# Patient Record
Sex: MALE | Race: WHITE | NOT HISPANIC OR LATINO | URBAN - METROPOLITAN AREA
[De-identification: names, ages, dates, MRNs, and addresses within clinical notes are randomized per-mention and may not be internally consistent; named-entity substitution may affect disease eponyms.]

---

## 2018-02-24 ENCOUNTER — EMERGENCY (EMERGENCY)
Facility: HOSPITAL | Age: 58
LOS: 1 days | Discharge: HOME | End: 2018-02-24
Admitting: EMERGENCY MEDICINE
Payer: COMMERCIAL

## 2018-02-24 VITALS
OXYGEN SATURATION: 99 % | DIASTOLIC BLOOD PRESSURE: 76 MMHG | HEIGHT: 71 IN | SYSTOLIC BLOOD PRESSURE: 139 MMHG | HEART RATE: 65 BPM | RESPIRATION RATE: 18 BRPM | WEIGHT: 211.64 LBS | TEMPERATURE: 98 F

## 2018-02-24 VITALS
HEART RATE: 64 BPM | RESPIRATION RATE: 17 BRPM | TEMPERATURE: 97 F | OXYGEN SATURATION: 99 % | DIASTOLIC BLOOD PRESSURE: 62 MMHG | SYSTOLIC BLOOD PRESSURE: 134 MMHG

## 2018-02-24 DIAGNOSIS — Z88.1 ALLERGY STATUS TO OTHER ANTIBIOTIC AGENTS STATUS: ICD-10-CM

## 2018-02-24 DIAGNOSIS — M25.571 PAIN IN RIGHT ANKLE AND JOINTS OF RIGHT FOOT: ICD-10-CM

## 2018-02-24 DIAGNOSIS — Z98.89 OTHER SPECIFIED POSTPROCEDURAL STATES: Chronic | ICD-10-CM

## 2018-02-24 DIAGNOSIS — M10.9 GOUT, UNSPECIFIED: ICD-10-CM

## 2018-02-24 DIAGNOSIS — Z98.52 VASECTOMY STATUS: Chronic | ICD-10-CM

## 2018-02-24 DIAGNOSIS — Z79.899 OTHER LONG TERM (CURRENT) DRUG THERAPY: ICD-10-CM

## 2018-02-24 PROCEDURE — 73630 X-RAY EXAM OF FOOT: CPT

## 2018-02-24 PROCEDURE — 99284 EMERGENCY DEPT VISIT MOD MDM: CPT | Mod: 25

## 2018-02-24 PROCEDURE — 73610 X-RAY EXAM OF ANKLE: CPT

## 2018-02-24 PROCEDURE — 73630 X-RAY EXAM OF FOOT: CPT | Mod: 26,RT

## 2018-02-24 PROCEDURE — 73610 X-RAY EXAM OF ANKLE: CPT | Mod: 26,RT

## 2018-02-24 PROCEDURE — 99283 EMERGENCY DEPT VISIT LOW MDM: CPT | Mod: 25

## 2018-02-24 RX ORDER — ALLOPURINOL 300 MG
0 TABLET ORAL
Qty: 0 | Refills: 0 | COMMUNITY

## 2018-02-24 RX ORDER — OXYCODONE AND ACETAMINOPHEN 5; 325 MG/1; MG/1
1 TABLET ORAL ONCE
Qty: 0 | Refills: 0 | Status: DISCONTINUED | OUTPATIENT
Start: 2018-02-24 | End: 2018-02-24

## 2018-02-24 RX ORDER — IBUPROFEN 200 MG
600 TABLET ORAL ONCE
Qty: 0 | Refills: 0 | Status: COMPLETED | OUTPATIENT
Start: 2018-02-24 | End: 2018-02-24

## 2018-02-24 RX ORDER — IBUPROFEN 200 MG
1 TABLET ORAL
Qty: 21 | Refills: 0 | OUTPATIENT
Start: 2018-02-24 | End: 2018-03-02

## 2018-02-24 RX ADMIN — OXYCODONE AND ACETAMINOPHEN 1 TABLET(S): 5; 325 TABLET ORAL at 06:13

## 2018-02-24 RX ADMIN — OXYCODONE AND ACETAMINOPHEN 1 TABLET(S): 5; 325 TABLET ORAL at 04:32

## 2018-02-24 RX ADMIN — Medication 60 MILLIGRAM(S): at 03:24

## 2018-02-24 RX ADMIN — OXYCODONE AND ACETAMINOPHEN 1 TABLET(S): 5; 325 TABLET ORAL at 03:24

## 2018-02-24 RX ADMIN — Medication 600 MILLIGRAM(S): at 06:11

## 2018-02-24 NOTE — ED PROVIDER NOTE - SKIN, MLM
Skin normal color for race, warm, dry and intact. No evidence of rash. no evidence of cellulitis to right foot and ankle

## 2018-02-24 NOTE — ED ADULT NURSE NOTE - OBJECTIVE STATEMENT
Pt states "I have a flare up of Gout in my rt ankle. It is 8/10 and it really hurts. I am here on vacation and I missed a couple doses of my allopurinol and I think that's the reason I'm having a flare up".

## 2018-02-24 NOTE — ED PROVIDER NOTE - OBJECTIVE STATEMENT
56 yo male in the Er with pain to his right ankle/foot x 2 days. Pt reports that he has h/o gout and had similar pain in the past. Its consistent with his gout flare. Pt was not able to sleep tonight at home due to pain,   denies any injury to hisright LE, denies paresthesia.

## 2018-02-24 NOTE — ED PROVIDER NOTE - MUSCULOSKELETAL, MLM
Spine appears normal, range of motion is not limited, right ankle edema+, slight warmth to touch+, decreased RPM due to pain, good distal pulses, sensations intact,

## 2018-02-24 NOTE — ED PROVIDER NOTE - MEDICAL DECISION MAKING DETAILS
56 yo male with h/o gout in the Er due to right ankle/foot pain x 2 days. had same in the past, had gout attack and pain to the same ankle. Pt requests prednisone and pain meds untill he will see his doctor. no injury, no fx on the xray, no signs of cellulitis on exam. pt ambulatory, pain subsided after a dose of percocet and prednisone PO, pt seeking d/c home.

## 2019-04-18 ENCOUNTER — APPOINTMENT (OUTPATIENT)
Dept: UROLOGY | Facility: CLINIC | Age: 59
End: 2019-04-18
Payer: COMMERCIAL

## 2019-04-18 VITALS
TEMPERATURE: 98.9 F | WEIGHT: 210 LBS | BODY MASS INDEX: 29.4 KG/M2 | HEART RATE: 56 BPM | HEIGHT: 71 IN | SYSTOLIC BLOOD PRESSURE: 144 MMHG | DIASTOLIC BLOOD PRESSURE: 84 MMHG

## 2019-04-18 PROBLEM — E78.00 PURE HYPERCHOLESTEROLEMIA, UNSPECIFIED: Chronic | Status: ACTIVE | Noted: 2018-02-24

## 2019-04-18 PROBLEM — Z00.00 ENCOUNTER FOR PREVENTIVE HEALTH EXAMINATION: Status: ACTIVE | Noted: 2019-04-18

## 2019-04-18 PROBLEM — M10.9 GOUT, UNSPECIFIED: Chronic | Status: ACTIVE | Noted: 2018-02-24

## 2019-04-18 PROCEDURE — 99204 OFFICE O/P NEW MOD 45 MIN: CPT

## 2019-04-18 NOTE — LETTER BODY
[Dear  ___] : Dear  [unfilled], [FreeTextEntry2] : Adali Drew MD\par [address] \[place] [address] [FreeTextEntry1] : I had the pleasure of seeing your patient Humphrey Mota in the office in consultation today. Please see the attached note for full details.\par \par Thank you very much for allowing me to participate in the care of this patient. If you have any questions please feel free to call me at any time. \par \par \par Sincerely yours,\par \par \par \par Orlin Harper MD, ENOCH\par Director, Male Fertility and Microsurgery\par  of Urology\par [facility]\par \par \par This letter has been dictated using computer software but not reviewed to expedite transmission.\par

## 2019-04-18 NOTE — PHYSICAL EXAM
[General Appearance - Well Developed] : well developed [General Appearance - Well Nourished] : well nourished [Normal Appearance] : normal appearance [Well Groomed] : well groomed [Heart Rate And Rhythm] : Heart rate and rhythm were normal [] : no respiratory distress [Abdomen Soft] : soft [Abdomen Tenderness] : non-tender [Costovertebral Angle Tenderness] : no ~M costovertebral angle tenderness [Urethral Meatus] : meatus normal [Abdomen Hernia] : no hernia was discovered [Urinary Bladder Findings] : the bladder was normal on palpation [Scrotum] : the scrotum was normal [Epididymis] : the epididymides were normal [Testes Tenderness] : no tenderness of the testes [Prostate Tenderness] : the prostate was not tender [Testes Mass (___cm)] : there were no testicular masses [No Prostate Nodules] : no prostate nodules [Prostate Size ___ gm] : prostate size [unfilled] gm [Normal Station and Gait] : the gait and station were normal for the patient's age [Skin Color & Pigmentation] : normal skin color and pigmentation [Oriented To Time, Place, And Person] : oriented to person, place, and time [No Focal Deficits] : no focal deficits [No Palpable Adenopathy] : no palpable adenopathy [FreeTextEntry1] : 2-3cm patch right dorsal penis mid shaft. 4 confluent vesicles. erythematous and raised. mild right groin adenopathy.

## 2019-04-18 NOTE — HISTORY OF PRESENT ILLNESS
[FreeTextEntry1] : 58M history BPH previously on flomax. IPSS 14. nocutria x 2. moderate bother. decreased FOS. no incotnnence. +frequency. occasional urgency. +double voiding. +hesitancy.\par referred with penile lesion. has had a few new sexual partners. noted to have a lesion on penis x  1week. complete STD testing reportedly negative. told Hepatitis, HIV, herpes testing resolved. now with additonal lesions around primary lesion. father with history prostate cancer s/p RALP

## 2019-04-18 NOTE — ASSESSMENT
[FreeTextEntry1] : penile lesion\par trial doxycycline\par refer to derm\par ?lymphogranuloma venereum\par \par BPH\par discussed flomax\par discussed PUL\par PSA\par UA UCX GC testing\par tuan call with decision

## 2019-04-22 LAB
APPEARANCE: CLEAR
BACTERIA UR CULT: NORMAL
BACTERIA: NEGATIVE
BILIRUBIN URINE: NEGATIVE
BLOOD URINE: NEGATIVE
COLOR: NORMAL
GLUCOSE QUALITATIVE U: NEGATIVE
HYALINE CASTS: 0 /LPF
KETONES URINE: NEGATIVE
LEUKOCYTE ESTERASE URINE: NEGATIVE
MICROSCOPIC-UA: NORMAL
N GONORRHOEA RRNA SPEC QL NAA+PROBE: NOT DETECTED
NITRITE URINE: NEGATIVE
PH URINE: 6
PROTEIN URINE: NEGATIVE
PSA SERPL-MCNC: 1.12 NG/ML
RED BLOOD CELLS URINE: 0 /HPF
SOURCE AMPLIFICATION: NORMAL
SPECIFIC GRAVITY URINE: 1.02
SQUAMOUS EPITHELIAL CELLS: 0 /HPF
UROBILINOGEN URINE: NORMAL
WHITE BLOOD CELLS URINE: 0 /HPF

## 2020-03-11 ENCOUNTER — OUTPATIENT (OUTPATIENT)
Dept: OUTPATIENT SERVICES | Facility: HOSPITAL | Age: 60
LOS: 1 days | End: 2020-03-11
Payer: COMMERCIAL

## 2020-03-11 DIAGNOSIS — Z98.89 OTHER SPECIFIED POSTPROCEDURAL STATES: Chronic | ICD-10-CM

## 2020-03-11 DIAGNOSIS — Z98.52 VASECTOMY STATUS: Chronic | ICD-10-CM

## 2020-03-11 PROCEDURE — 71046 X-RAY EXAM CHEST 2 VIEWS: CPT | Mod: 26

## 2020-03-11 PROCEDURE — 71046 X-RAY EXAM CHEST 2 VIEWS: CPT

## 2021-05-19 ENCOUNTER — APPOINTMENT (OUTPATIENT)
Dept: HEART AND VASCULAR | Facility: CLINIC | Age: 61
End: 2021-05-19
Payer: COMMERCIAL

## 2021-05-19 VITALS
TEMPERATURE: 98 F | DIASTOLIC BLOOD PRESSURE: 85 MMHG | HEART RATE: 58 BPM | HEIGHT: 71 IN | BODY MASS INDEX: 28.56 KG/M2 | WEIGHT: 204 LBS | SYSTOLIC BLOOD PRESSURE: 169 MMHG

## 2021-05-19 PROCEDURE — 93000 ELECTROCARDIOGRAM COMPLETE: CPT

## 2021-05-19 PROCEDURE — 99214 OFFICE O/P EST MOD 30 MIN: CPT | Mod: 25

## 2021-05-19 PROCEDURE — 99072 ADDL SUPL MATRL&STAF TM PHE: CPT

## 2021-05-19 PROCEDURE — 93306 TTE W/DOPPLER COMPLETE: CPT

## 2021-05-19 PROCEDURE — 36415 COLL VENOUS BLD VENIPUNCTURE: CPT

## 2021-05-19 NOTE — DISCUSSION/SUMMARY
[FreeTextEntry1] : pt with new onset hypertension start losartan patient had a mildly elevated calcium score over 10 years ago and has been on Lipitor well reevaluate blood pressure on next visit and schedule a stress test to evaluate BP control and CAD patient has mild mitral regurgitation

## 2021-05-24 LAB
ALBUMIN SERPL ELPH-MCNC: 4.4 G/DL
ALP BLD-CCNC: 60 U/L
ALT SERPL-CCNC: 22 U/L
ANION GAP SERPL CALC-SCNC: 12 MMOL/L
AST SERPL-CCNC: 26 U/L
BASOPHILS # BLD AUTO: 0.03 K/UL
BASOPHILS NFR BLD AUTO: 0.6 %
BILIRUB SERPL-MCNC: 0.7 MG/DL
BUN SERPL-MCNC: 17 MG/DL
CALCIUM SERPL-MCNC: 9.4 MG/DL
CHLORIDE SERPL-SCNC: 104 MMOL/L
CHOLEST SERPL-MCNC: 127 MG/DL
CK SERPL-CCNC: 151 U/L
CO2 SERPL-SCNC: 27 MMOL/L
CREAT SERPL-MCNC: 1.11 MG/DL
EOSINOPHIL # BLD AUTO: 0.31 K/UL
EOSINOPHIL NFR BLD AUTO: 6.6 %
ESTIMATED AVERAGE GLUCOSE: 108 MG/DL
FERRITIN SERPL-MCNC: 113 NG/ML
GLUCOSE SERPL-MCNC: 95 MG/DL
HBA1C MFR BLD HPLC: 5.4 %
HCT VFR BLD CALC: 46.9 %
HDLC SERPL-MCNC: 54 MG/DL
HGB BLD-MCNC: 15.1 G/DL
IMM GRANULOCYTES NFR BLD AUTO: 0.4 %
LDLC SERPL CALC-MCNC: 52 MG/DL
LYMPHOCYTES # BLD AUTO: 1.2 K/UL
LYMPHOCYTES NFR BLD AUTO: 25.6 %
MAN DIFF?: NORMAL
MCHC RBC-ENTMCNC: 30.4 PG
MCHC RBC-ENTMCNC: 32.2 GM/DL
MCV RBC AUTO: 94.6 FL
MONOCYTES # BLD AUTO: 0.42 K/UL
MONOCYTES NFR BLD AUTO: 9 %
NEUTROPHILS # BLD AUTO: 2.71 K/UL
NEUTROPHILS NFR BLD AUTO: 57.8 %
NONHDLC SERPL-MCNC: 73 MG/DL
PLATELET # BLD AUTO: 195 K/UL
POTASSIUM SERPL-SCNC: 4.6 MMOL/L
PROT SERPL-MCNC: 6.7 G/DL
PSA SERPL-MCNC: 1.82 NG/ML
RBC # BLD: 4.96 M/UL
RBC # FLD: 13.1 %
SODIUM SERPL-SCNC: 143 MMOL/L
TRIGL SERPL-MCNC: 105 MG/DL
TSH SERPL-ACNC: 1.91 UIU/ML
VIT B12 SERPL-MCNC: 358 PG/ML
WBC # FLD AUTO: 4.69 K/UL

## 2021-07-15 ENCOUNTER — RX RENEWAL (OUTPATIENT)
Age: 61
End: 2021-07-15

## 2021-07-19 RX ORDER — ZOLPIDEM TARTRATE 10 MG/1
10 TABLET ORAL
Qty: 30 | Refills: 2 | Status: ACTIVE | COMMUNITY
Start: 2021-07-19 | End: 1900-01-01

## 2021-07-28 ENCOUNTER — APPOINTMENT (OUTPATIENT)
Dept: HEART AND VASCULAR | Facility: CLINIC | Age: 61
End: 2021-07-28
Payer: COMMERCIAL

## 2021-07-28 VITALS
DIASTOLIC BLOOD PRESSURE: 90 MMHG | BODY MASS INDEX: 27.86 KG/M2 | WEIGHT: 199 LBS | HEIGHT: 71 IN | TEMPERATURE: 97.6 F | SYSTOLIC BLOOD PRESSURE: 130 MMHG

## 2021-07-28 VITALS
HEIGHT: 71 IN | WEIGHT: 198 LBS | HEART RATE: 58 BPM | DIASTOLIC BLOOD PRESSURE: 92 MMHG | SYSTOLIC BLOOD PRESSURE: 128 MMHG | BODY MASS INDEX: 27.72 KG/M2

## 2021-07-28 PROCEDURE — 93015 CV STRESS TEST SUPVJ I&R: CPT

## 2021-07-28 PROCEDURE — 99213 OFFICE O/P EST LOW 20 MIN: CPT | Mod: 25

## 2021-08-09 ENCOUNTER — NON-APPOINTMENT (OUTPATIENT)
Age: 61
End: 2021-08-09

## 2021-08-11 ENCOUNTER — APPOINTMENT (OUTPATIENT)
Dept: UROLOGY | Facility: CLINIC | Age: 61
End: 2021-08-11
Payer: COMMERCIAL

## 2021-08-11 VITALS — SYSTOLIC BLOOD PRESSURE: 148 MMHG | TEMPERATURE: 98.24 F | DIASTOLIC BLOOD PRESSURE: 86 MMHG | HEART RATE: 91 BPM

## 2021-08-11 DIAGNOSIS — N48.9 DISORDER OF PENIS, UNSPECIFIED: ICD-10-CM

## 2021-08-11 DIAGNOSIS — Z80.42 FAMILY HISTORY OF MALIGNANT NEOPLASM OF PROSTATE: ICD-10-CM

## 2021-08-11 PROCEDURE — 99214 OFFICE O/P EST MOD 30 MIN: CPT

## 2021-08-11 RX ORDER — ALLOPURINOL 100 MG/1
100 TABLET ORAL
Refills: 0 | Status: COMPLETED | COMMUNITY
End: 2021-08-11

## 2021-08-11 RX ORDER — NALTREXONE HYDROCHLORIDE 50 MG/1
50 TABLET, FILM COATED ORAL
Refills: 0 | Status: ACTIVE | COMMUNITY

## 2021-08-11 RX ORDER — VALACYCLOVIR 500 MG/1
500 TABLET, FILM COATED ORAL
Qty: 6 | Refills: 0 | Status: ACTIVE | COMMUNITY
Start: 2021-08-11 | End: 1900-01-01

## 2021-08-11 NOTE — HISTORY OF PRESENT ILLNESS
[FreeTextEntry1] : Dear Dr. Drew,\par \par Thank you so much for the referral to help care for your patient.\par \par Chief Complaint: BPH\par Date of first visit: 08/11/2021\par \par FRANCESCA BURROWS is a  61 year old  man with PMHx HTN, gout, HLD, ?alcoholism who presents for BPH. His urinary symptoms have been occurring for 3 years. He reports nocturiax2, incomplete emptying, weak FOS, straining and frrequency. He tried Flomax years ago for a few months but felt it did not improve his symptoms at all. He has noticed a curvature with erections over the past year which is starting to bother him. Reports curve is ~15 degrees. \par \par He is concerned of possible STI. Two years ago he noticed a lesion, was tested for HSV2 and was negative. Had another lesion 6 months ago. He is not monogamous but uses protection. The lesions are not painful and are slightly pruritic at times. He is currently going through a lot of stress. His wife lost her job and they are in the process of selling multiple houses. Since this new stress, the lesions have been occurring more frequently. Another provider (unsure of name) gave him valacyclovir and he feels he responds to the medication and the lesions resolve. \par \par His PSAs are normal, most recently 1.82 ng/mL on 5/19/21. He has never had a prostate MRI or biopsy. He does have a family hx of CaP in his father. His father was diagnosed in his 60s, had a prostatectomy, and he is still alive and well.\par \par He is currently on naltrexone to brandie his possible alcoholism. Seeing a psychiatrist who has prescribed this.\par \par 8/11/21 Flow/PVR: Peak 10.2 ml/s, vv 194ml, PVR 42ml\par \par PSA HX\par 5/19/2021- 1.82 NG/ML\par 4/18/2019- 1.12 NG/ML\par \par 08/11/2021 \par IPSS 22  QOL 4\par LISA 18\par \par The patient denies dysuria, hematuria, fevers, chills, nausea and or vomiting and no unexplained weight loss.\par \par All pertinent laboratory, films and physician notes were reviewed. Questionnaire results were discussed with patient.

## 2021-08-11 NOTE — PHYSICAL EXAM
[Prostate Size ___ gm] : prostate size [unfilled] gm [General Appearance - Well Developed] : well developed [General Appearance - Well Nourished] : well nourished [Normal Appearance] : normal appearance [Well Groomed] : well groomed [General Appearance - In No Acute Distress] : no acute distress [Edema] : no peripheral edema [Respiration, Rhythm And Depth] : normal respiratory rhythm and effort [Exaggerated Use Of Accessory Muscles For Inspiration] : no accessory muscle use [Abdomen Soft] : soft [Abdomen Tenderness] : non-tender [] : no hepato-splenomegaly [Abdomen Hernia] : no hernia was discovered [Costovertebral Angle Tenderness] : no ~M costovertebral angle tenderness [Urinary Bladder Findings] : the bladder was normal on palpation [Scrotum] : the scrotum was normal [Testes Tenderness] : no tenderness of the testes [Testes Mass (___cm)] : there were no testicular masses [Prostate Tenderness] : the prostate was not tender [No Prostate Nodules] : no prostate nodules [Normal Station and Gait] : the gait and station were normal for the patient's age [No Focal Deficits] : no focal deficits [Oriented To Time, Place, And Person] : oriented to person, place, and time [Affect] : the affect was normal [Mood] : the mood was normal [Not Anxious] : not anxious [No Palpable Adenopathy] : no palpable adenopathy [Urethral Meatus] : meatus normal [FreeTextEntry1] : Palpable plaques bilaterally, small cluster of 3 1mm vesicles on dorsal side of shaft

## 2021-08-11 NOTE — ASSESSMENT
[FreeTextEntry1] : 62 yo male with BPH, penile lesions, Peyronie's. Normal PSA, +FH, neg LIZETTE. He reports a scrotal mass post vasectomy which is not appreciated on exam. He states it has not changed in the past 15 years.\par \par 1. 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 Dermatology follow up if does not resolve\par 4. ?Scrotal mass-unchanged post vasectomy-offered scrotal US. He would like to continue to monitor for changes and US if changes occur\par 5. Flomax- he patient understands that this medication may cause dizziness, retrograde ejaculation or nasal congestion among other complications. I recommended that the patient take this medication at night. If the side effects become too bothersome, the medication can be discontinued.\par 6. PVR 42 but does not feel empty--milk urethra\par 7. Nocturia--he has been tested for JERMAINE which he does not have\par 8. Uric acid per his request\par \par Follow up 1 month with Flow/PVR\par Follow up with Dr Harper for Peyronie's \par \par Thank you very much for allowing me to assist in the care of this patient. Should you have any additional questions or concerns please do not hesitate to contact me.\par \par \par Sincerely,\par \par \par Jules Ray D.O.\par  of Urology and Radiology\par  of Urology at [facility]\par System Director for Prostate Cancer\par [address], [address] [facility], [address]\par Phone: [phone]\par

## 2021-08-12 ENCOUNTER — NON-APPOINTMENT (OUTPATIENT)
Age: 61
End: 2021-08-12

## 2021-08-12 LAB
HSV 1+2 IGG SER IA-IMP: NEGATIVE
HSV 1+2 IGG SER IA-IMP: POSITIVE
HSV1 IGG SER QL: 0.16 INDEX
HSV2 IGG SER QL: 10.8 INDEX
URATE SERPL-MCNC: 8.1 MG/DL

## 2021-09-14 LAB — HIV1+2 AB SPEC QL IA.RAPID: NONREACTIVE

## 2021-09-15 ENCOUNTER — APPOINTMENT (OUTPATIENT)
Dept: UROLOGY | Facility: CLINIC | Age: 61
End: 2021-09-15
Payer: COMMERCIAL

## 2021-09-15 VITALS — DIASTOLIC BLOOD PRESSURE: 78 MMHG | SYSTOLIC BLOOD PRESSURE: 156 MMHG | HEART RATE: 69 BPM | TEMPERATURE: 208.76 F

## 2021-09-15 DIAGNOSIS — B00.9 HERPESVIRAL INFECTION, UNSPECIFIED: ICD-10-CM

## 2021-09-15 LAB
C TRACH RRNA SPEC QL NAA+PROBE: NOT DETECTED
N GONORRHOEA RRNA SPEC QL NAA+PROBE: NOT DETECTED
RPR SER-TITR: NORMAL
SOURCE AMPLIFICATION: NORMAL

## 2021-09-15 PROCEDURE — 99214 OFFICE O/P EST MOD 30 MIN: CPT

## 2021-09-15 NOTE — HISTORY OF PRESENT ILLNESS
[FreeTextEntry1] : Dear Dr. Drew,\par \par Thank you so much for the referral to help care for your patient.\par \par Chief Complaint: BPH\par Date of first visit: 08/11/2021\par \par FRANCESCA BURROWS is a  61 year old  man with PMHx HTN, gout, HLD, ?alcoholism who presents for BPH. His urinary symptoms have been occurring for 3 years and returns for follow up after being on Flomax x1 month. He feels his urinary symptoms have improved greatly with stronger FOS however nocturia still occurring x3 even with fluid restriction.\par \par He has noticed a curvature with erections over the past year which is starting to bother him. Reports curve is ~15 degrees. Has not seen Dr Harper yet.\par \par HSV 2+  - given Valtrex x3 days at last office visit and "rash" has resolved. STI panel negative. Patient started noticing "lesions" two years ago, was tested for HSV2 and was negative. Had another lesion 6 months ago. He is not monogamous but uses protection. The lesions are not painful and are slightly pruritic at times. He is currently going through a lot of stress. His wife lost her job and they are in the process of selling multiple houses. Since this new stress, the lesions have been occurring more frequently. \par \par His PSAs are normal, most recently 1.82 ng/mL on 5/19/21. He has never had a prostate MRI or biopsy. He does have a family hx of CaP in his father. His father was diagnosed in his 60s, had a prostatectomy, and he is still alive and well.\par \par He is currently on naltrexone to brandie his possible alcoholism. Seeing a psychiatrist who has prescribed this.\par \par 9/15/21 Flow/PVR: Peak 22.5 ml/s, avg 11.7 ml/s, vv 317.7, 15cc\par 8/11/21 Flow/PVR: Peak 10.2 ml/s, vv 194ml, PVR 42ml\par \par PSA HX\par 5/19/2021- 1.82 \par 4/18/2019- 1.12 \par \par 9/15/2021\par IPSS 18 QOL 3\par LISA 19\par \par 08/11/2021 \par IPSS 22  QOL 4\par LISA 18\par \par The patient denies dysuria, hematuria, fevers, chills, nausea and or vomiting and no unexplained weight loss.\par \par All pertinent laboratory, films and physician notes were reviewed. Questionnaire results were discussed with patient.

## 2021-09-15 NOTE — PHYSICAL EXAM
[General Appearance - Well Developed] : well developed [General Appearance - Well Nourished] : well nourished [Normal Appearance] : normal appearance [Well Groomed] : well groomed [General Appearance - In No Acute Distress] : no acute distress [Abdomen Soft] : soft [Abdomen Tenderness] : non-tender [Costovertebral Angle Tenderness] : no ~M costovertebral angle tenderness [Urinary Bladder Findings] : the bladder was normal on palpation [Edema] : no peripheral edema [Respiration, Rhythm And Depth] : normal respiratory rhythm and effort [Exaggerated Use Of Accessory Muscles For Inspiration] : no accessory muscle use [Oriented To Time, Place, And Person] : oriented to person, place, and time [Affect] : the affect was normal [Mood] : the mood was normal [Not Anxious] : not anxious [Normal Station and Gait] : the gait and station were normal for the patient's age [No Focal Deficits] : no focal deficits [] : no rash [FreeTextEntry1] : neg LIZETTE 8/11/21, PVR 15

## 2021-09-15 NOTE — ASSESSMENT
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 Normal PSA 5/2021, +FH, neg LIZETTE. He reports a scrotal mass post vasectomy which is not appreciated on exam. He states it has not changed in the past 15 years. BPH symptoms responding well to 0.4mg Flomax, peak flow has doubled, PVR 15.\par \par 1. Discussed importance of suppressive therapy and condom use at all times. We will prescribe 500mg valtrex daily however he would like to think about it. He understands the risk of not taking medication and the importance of protection\par 2. Continue Flomax\par 3. ?Scrotal mass-unchanged post vasectomy-offered scrotal US. He would like to continue to monitor for changes and US if changes occur\par 4. Nocturia--he has been tested for JERMAINE which he does not have\par \par Follow up with Dr Harper for Peyronie's \par Follow up with us in 6 months with Flow/PVR\par \par Thank you very much for allowing me to assist in the care of this patient. Should you have any additional questions or concerns please do not hesitate to contact me.\par \par \par Sincerely,\par \par \par Jules Ray D.O.\par  of Urology and Radiology\par  of Urology at [facility]\par System Director for Prostate Cancer\par [address] [place] [address], [address] [facility], [address]\par Phone: [phone]\par

## 2021-10-21 ENCOUNTER — APPOINTMENT (OUTPATIENT)
Dept: UROLOGY | Facility: CLINIC | Age: 61
End: 2021-10-21
Payer: COMMERCIAL

## 2021-10-21 VITALS
SYSTOLIC BLOOD PRESSURE: 142 MMHG | HEIGHT: 71 IN | HEART RATE: 57 BPM | WEIGHT: 202 LBS | TEMPERATURE: 97.6 F | DIASTOLIC BLOOD PRESSURE: 82 MMHG | BODY MASS INDEX: 28.28 KG/M2

## 2021-10-21 PROCEDURE — 99213 OFFICE O/P EST LOW 20 MIN: CPT

## 2021-10-21 NOTE — ASSESSMENT
[FreeTextEntry1] : Peyronie's\par Discussed causes and treatment options\par Dorsal right curvature\par increasing bothersome angulation - no pain\par Penile doppler US - for curvature assessment\par reassured \par \par Follow Penile Doppler

## 2021-10-21 NOTE — PHYSICAL EXAM
[General Appearance - Well Developed] : well developed [General Appearance - Well Nourished] : well nourished [Normal Appearance] : normal appearance [Well Groomed] : well groomed [General Appearance - In No Acute Distress] : no acute distress [Abdomen Soft] : soft [Abdomen Tenderness] : non-tender [Costovertebral Angle Tenderness] : no ~M costovertebral angle tenderness [Urethral Meatus] : meatus normal [Penis Abnormality] : normal circumcised penis [Urinary Bladder Findings] : the bladder was normal on palpation [Scrotum] : the scrotum was normal [Edema] : no peripheral edema [] : no respiratory distress [Respiration, Rhythm And Depth] : normal respiratory rhythm and effort [Exaggerated Use Of Accessory Muscles For Inspiration] : no accessory muscle use [Oriented To Time, Place, And Person] : oriented to person, place, and time [Affect] : the affect was normal [Mood] : the mood was normal [Not Anxious] : not anxious [Normal Station and Gait] : the gait and station were normal for the patient's age [No Focal Deficits] : no focal deficits [No Palpable Adenopathy] : no palpable adenopathy [FreeTextEntry1] : right palpable plague

## 2021-10-21 NOTE — END OF VISIT
[FreeTextEntry3] : I have seen and evaluated the patient and formulated a plan of care after the NP/resident or fellow saw the patient. I have edited the note above to reflect my recommendations and agree with the documentation and plan as set forth.\par

## 2021-10-21 NOTE — HISTORY OF PRESENT ILLNESS
[None] : None [FreeTextEntry1] : This is a 61 yr old man PMHx HTN, Gout, HLD and BPH\par Presents today to establish care with complaints of penile dorsal  right sided curvature, \par Progressively getting worse since March 2021\par Denies rough intercourse, no trauma.\par No pain on erection\par Bothersome \par Denies voiding problems. \par Denies dysuria, hematuria, flank pain\par

## 2021-11-02 RX ORDER — PAPAVERINE HYDROCHLORIDE 30 MG/ML
30 INJECTION, SOLUTION INTRAVENOUS
Qty: 5 | Refills: 0 | Status: ACTIVE | COMMUNITY
Start: 2021-10-21 | End: 1900-01-01

## 2021-11-16 ENCOUNTER — APPOINTMENT (OUTPATIENT)
Dept: UROLOGY | Facility: CLINIC | Age: 61
End: 2021-11-16
Payer: COMMERCIAL

## 2021-11-16 VITALS — SYSTOLIC BLOOD PRESSURE: 156 MMHG | TEMPERATURE: 97.3 F | HEART RATE: 55 BPM | DIASTOLIC BLOOD PRESSURE: 81 MMHG

## 2021-11-16 PROCEDURE — 93980 PENILE VASCULAR STUDY: CPT

## 2021-11-16 PROCEDURE — 99214 OFFICE O/P EST MOD 30 MIN: CPT | Mod: 25

## 2021-11-16 PROCEDURE — 54235 NJX CORPORA CAVERNOSA RX AGT: CPT

## 2021-11-16 PROCEDURE — 93981 PENILE VASCULAR STUDY: CPT

## 2021-11-16 NOTE — ASSESSMENT
[FreeTextEntry1] : chornic peyronies\par We had a long conversation regarding treatment options in chronic Peyronie's disease. Treatments, including observation, the role of Xiaflex, penile plication surgery, plaque incision and grafting surgery, and insertion of inflatable penile prosthesis, were discussed at length.\par \par The risks and benefits of Xiaflex were discussed. These include, significant hematoma and a small risk of penile fracture. The perceived and reported benefits of this treatment were discussed at length as well. The fact that he needs to abstain from sexual activity after treatment was discussed as was the length of the treatment regimen. In addition he understands that he will need to use a penile traction device and we spoke about the role of penile modeling. Literature fully delineating what to expect from this treatment was provided to the patient. \par \par In addition, the risks of surgical treatment were discussed. With penile plication, he understands that shortening of the penis may be perceived. In addition, palpable knots from the sutures placed under the skin may be present. Possible risk of injury to the urethra and to other penile structures was discussed at length as well. Decreased penile sensitivity has also been reported. With plaque incision and grafting, the risk of long-standing erectile dysfunction in the postoperative period was discussed. Glans numbness and decreased penile sensitivity as a risk of the procedure was discussed as well.\par \par Finally, the role of inflatable penile prosthesis as a treatment for both Peyronie's disease and erectile dysfunction was discussed. The 1-2% risk of device infection, device malfunction rates, risks of injury to contiguous organs including the urethra, risk of phallic shortening as well as the possiblity that additional intraoperative adjunctive maneuvers would be necessary (including modeling and plication) to get him to a straight phallus was also discussed.\par \par considering observation\par will call with decision

## 2021-11-18 ENCOUNTER — NON-APPOINTMENT (OUTPATIENT)
Age: 61
End: 2021-11-18

## 2021-11-30 ENCOUNTER — APPOINTMENT (OUTPATIENT)
Dept: UROLOGY | Facility: CLINIC | Age: 61
End: 2021-11-30
Payer: COMMERCIAL

## 2021-11-30 VITALS — TEMPERATURE: 98.7 F | DIASTOLIC BLOOD PRESSURE: 74 MMHG | SYSTOLIC BLOOD PRESSURE: 128 MMHG | HEART RATE: 62 BPM

## 2021-11-30 PROCEDURE — 99212 OFFICE O/P EST SF 10 MIN: CPT

## 2021-12-01 NOTE — HISTORY OF PRESENT ILLNESS
[FreeTextEntry1] : raised 5mm papule lateral phallus at site of previous ICI\par nontender\par mijnimal pus expressed\par local wound care\par f/u PRN\par

## 2021-12-07 ENCOUNTER — TRANSCRIPTION ENCOUNTER (OUTPATIENT)
Age: 61
End: 2021-12-07

## 2021-12-16 ENCOUNTER — APPOINTMENT (OUTPATIENT)
Dept: UROLOGY | Facility: CLINIC | Age: 61
End: 2021-12-16

## 2022-08-12 ENCOUNTER — NON-APPOINTMENT (OUTPATIENT)
Age: 62
End: 2022-08-12

## 2022-10-12 ENCOUNTER — RX RENEWAL (OUTPATIENT)
Age: 62
End: 2022-10-12

## 2022-10-21 ENCOUNTER — APPOINTMENT (OUTPATIENT)
Dept: HEART AND VASCULAR | Facility: CLINIC | Age: 62
End: 2022-10-21

## 2022-10-21 PROCEDURE — 99212 OFFICE O/P EST SF 10 MIN: CPT | Mod: 95

## 2022-10-24 NOTE — REASON FOR VISIT
[CV Risk Factors and Non-Cardiac Disease] : CV risk factors and non-cardiac disease [FreeTextEntry1] : pt with strong fh of cadpt anxious about heart health and htn

## 2022-10-24 NOTE — ASSESSMENT
[FreeTextEntry1] : pt with vague cp and jennings on max exertion will schedule for echocardiiogram and ett

## 2022-10-28 ENCOUNTER — APPOINTMENT (OUTPATIENT)
Dept: UROLOGY | Facility: CLINIC | Age: 62
End: 2022-10-28

## 2022-10-28 VITALS
WEIGHT: 200 LBS | HEART RATE: 52 BPM | DIASTOLIC BLOOD PRESSURE: 74 MMHG | SYSTOLIC BLOOD PRESSURE: 124 MMHG | TEMPERATURE: 97.6 F | BODY MASS INDEX: 28 KG/M2 | HEIGHT: 71 IN

## 2022-10-28 DIAGNOSIS — N48.6 INDURATION PENIS PLASTICA: ICD-10-CM

## 2022-10-28 PROCEDURE — 99214 OFFICE O/P EST MOD 30 MIN: CPT

## 2022-10-28 RX ORDER — TAMSULOSIN HYDROCHLORIDE 0.4 MG/1
0.4 CAPSULE ORAL
Qty: 180 | Refills: 3 | Status: DISCONTINUED | COMMUNITY
Start: 2021-08-11 | End: 2022-10-28

## 2022-10-28 NOTE — HISTORY OF PRESENT ILLNESS
[Currently Experiencing ___] :  [unfilled] [Urinary Incontinence] : urinary incontinence [Urinary Urgency] : urinary urgency [Urinary Frequency] : urinary frequency [Nocturia] : nocturia [Weak Stream] : weak stream [Erectile Dysfunction] : Erectile Dysfunction [FreeTextEntry1] : 62 yr old man PMHx HTN, HSV2, Gout, HLD, BPH, Pyronies  and Erectile dysfunction\par returns for annual follow up \par complains of nocturia x2, frequency and post void dribble \par Bothersome \par PSA 1.82 May 2021\par Currently on Tamsulosin 0.4 mg \par Concern of left testicle "cyst" with sensitivity\par Denies dysuria, hematuria, flank pain \par

## 2022-10-28 NOTE — PHYSICAL EXAM
[General Appearance - Well Developed] : well developed [General Appearance - Well Nourished] : well nourished [Normal Appearance] : normal appearance [Well Groomed] : well groomed [General Appearance - In No Acute Distress] : no acute distress [Abdomen Soft] : soft [Abdomen Tenderness] : non-tender [Costovertebral Angle Tenderness] : no ~M costovertebral angle tenderness [Urethral Meatus] : meatus normal [Penis Abnormality] : normal circumcised penis [Urinary Bladder Findings] : the bladder was normal on palpation [Scrotum] : the scrotum was normal [Edema] : no peripheral edema [] : no respiratory distress [Respiration, Rhythm And Depth] : normal respiratory rhythm and effort [Exaggerated Use Of Accessory Muscles For Inspiration] : no accessory muscle use [Oriented To Time, Place, And Person] : oriented to person, place, and time [Affect] : the affect was normal [Mood] : the mood was normal [Not Anxious] : not anxious [Normal Station and Gait] : the gait and station were normal for the patient's age [No Focal Deficits] : no focal deficits [No Palpable Adenopathy] : no palpable adenopathy [FreeTextEntry1] : left GII varivocele, Left epididymal cyst, Left 18 cc testis, right 20 cc testis, LIZETTE deferred by patient

## 2022-10-28 NOTE — ASSESSMENT
[FreeTextEntry1] : BPH with nocturia\par Reviewed treatment options\par bothersome symptoms \par dc tamsulosin\par trial alfuzosin\par increasing bother\par PVR - r/o urinary retention = 35 ml \par Labs: UA, UC PSA\par \par Peyronie's\par Reviewed treatment options\par no interest for now\par he is considering penile traction device\par \par f/u 6 months

## 2022-11-30 ENCOUNTER — APPOINTMENT (OUTPATIENT)
Dept: HEART AND VASCULAR | Facility: CLINIC | Age: 62
End: 2022-11-30

## 2022-11-30 VITALS
DIASTOLIC BLOOD PRESSURE: 70 MMHG | WEIGHT: 200 LBS | SYSTOLIC BLOOD PRESSURE: 130 MMHG | HEIGHT: 71 IN | BODY MASS INDEX: 28 KG/M2

## 2022-11-30 DIAGNOSIS — R07.9 CHEST PAIN, UNSPECIFIED: ICD-10-CM

## 2022-11-30 DIAGNOSIS — I10 ESSENTIAL (PRIMARY) HYPERTENSION: ICD-10-CM

## 2022-11-30 PROCEDURE — 99214 OFFICE O/P EST MOD 30 MIN: CPT | Mod: 25

## 2022-11-30 PROCEDURE — 36415 COLL VENOUS BLD VENIPUNCTURE: CPT

## 2022-11-30 PROCEDURE — 93306 TTE W/DOPPLER COMPLETE: CPT

## 2022-11-30 PROCEDURE — 93015 CV STRESS TEST SUPVJ I&R: CPT

## 2022-12-01 PROBLEM — I10 ESSENTIAL HYPERTENSION: Status: ACTIVE | Noted: 2021-05-19

## 2022-12-01 PROBLEM — R07.9 CHEST PAIN: Status: ACTIVE | Noted: 2022-11-30

## 2022-12-01 LAB
ALBUMIN SERPL ELPH-MCNC: 4.8 G/DL
ALP BLD-CCNC: 52 U/L
ALT SERPL-CCNC: 26 U/L
ANION GAP SERPL CALC-SCNC: 13 MMOL/L
AST SERPL-CCNC: 30 U/L
BASOPHILS # BLD AUTO: 0.04 K/UL
BASOPHILS NFR BLD AUTO: 0.8 %
BILIRUB SERPL-MCNC: 1.1 MG/DL
BUN SERPL-MCNC: 21 MG/DL
CALCIUM SERPL-MCNC: 9.5 MG/DL
CHLORIDE SERPL-SCNC: 105 MMOL/L
CHOLEST SERPL-MCNC: 173 MG/DL
CK SERPL-CCNC: 188 U/L
CO2 SERPL-SCNC: 24 MMOL/L
CREAT SERPL-MCNC: 1.02 MG/DL
EGFR: 83 ML/MIN/1.73M2
EOSINOPHIL # BLD AUTO: 0.09 K/UL
EOSINOPHIL NFR BLD AUTO: 1.9 %
ESTIMATED AVERAGE GLUCOSE: 111 MG/DL
FERRITIN SERPL-MCNC: 121 NG/ML
GLUCOSE SERPL-MCNC: 98 MG/DL
HBA1C MFR BLD HPLC: 5.5 %
HCT VFR BLD CALC: 42.9 %
HDLC SERPL-MCNC: 70 MG/DL
HGB BLD-MCNC: 13.9 G/DL
IMM GRANULOCYTES NFR BLD AUTO: 0.2 %
LDLC SERPL CALC-MCNC: 93 MG/DL
LYMPHOCYTES # BLD AUTO: 1.53 K/UL
LYMPHOCYTES NFR BLD AUTO: 32.3 %
MAN DIFF?: NORMAL
MCHC RBC-ENTMCNC: 30.7 PG
MCHC RBC-ENTMCNC: 32.4 GM/DL
MCV RBC AUTO: 94.7 FL
MONOCYTES # BLD AUTO: 0.3 K/UL
MONOCYTES NFR BLD AUTO: 6.3 %
NEUTROPHILS # BLD AUTO: 2.76 K/UL
NEUTROPHILS NFR BLD AUTO: 58.5 %
NONHDLC SERPL-MCNC: 103 MG/DL
PLATELET # BLD AUTO: 191 K/UL
POTASSIUM SERPL-SCNC: 4.5 MMOL/L
PROT SERPL-MCNC: 6.9 G/DL
PSA SERPL-MCNC: 1.5 NG/ML
RBC # BLD: 4.53 M/UL
RBC # FLD: 12.8 %
SODIUM SERPL-SCNC: 142 MMOL/L
TRIGL SERPL-MCNC: 50 MG/DL
TSH SERPL-ACNC: 1.41 UIU/ML
VIT B12 SERPL-MCNC: 314 PG/ML
WBC # FLD AUTO: 4.73 K/UL

## 2022-12-01 NOTE — ASSESSMENT
[FreeTextEntry1] : Patient had a maximal maximal negative stress test\par \par Patient has mild LVH on echocardiogram\par \par Patient to continue on losartan 100/25\par \par Patient to continue on atorvastatin LDL 93 have reviewed a low-cholesterol diet with the patient as well as continuing his statin

## 2022-12-01 NOTE — REASON FOR VISIT
[Hyperlipidemia] : hyperlipidemia [Hypertension] : hypertension [FreeTextEntry1] : Patient has a strong family history of heart disease patient has had some vague chest pains and wants to exercise vigorously

## 2022-12-02 LAB
TESTOST FREE SERPL-MCNC: 16.7 PG/ML
TESTOST SERPL-MCNC: 448 NG/DL

## 2022-12-07 ENCOUNTER — RX RENEWAL (OUTPATIENT)
Age: 62
End: 2022-12-07

## 2023-01-26 ENCOUNTER — NON-APPOINTMENT (OUTPATIENT)
Age: 63
End: 2023-01-26

## 2023-02-17 ENCOUNTER — RX RENEWAL (OUTPATIENT)
Age: 63
End: 2023-02-17

## 2023-04-25 ENCOUNTER — APPOINTMENT (OUTPATIENT)
Dept: UROLOGY | Facility: CLINIC | Age: 63
End: 2023-04-25

## 2023-06-22 RX ORDER — VALACYCLOVIR 500 MG/1
500 TABLET, FILM COATED ORAL
Qty: 30 | Refills: 0 | Status: ACTIVE | COMMUNITY
Start: 2021-09-15 | End: 1900-01-01

## 2023-06-28 ENCOUNTER — APPOINTMENT (OUTPATIENT)
Dept: UROLOGY | Facility: CLINIC | Age: 63
End: 2023-06-28

## 2023-07-18 ENCOUNTER — APPOINTMENT (OUTPATIENT)
Dept: HEART AND VASCULAR | Facility: CLINIC | Age: 63
End: 2023-07-18

## 2023-07-18 NOTE — HISTORY OF PRESENT ILLNESS
[FreeTextEntry1] : 63 year old male with PMHX of HTN here for follow up\par \par Previous Work UP\par Stress Test ( 11/30/2022

## 2023-07-19 ENCOUNTER — APPOINTMENT (OUTPATIENT)
Dept: HEART AND VASCULAR | Facility: CLINIC | Age: 63
End: 2023-07-19
Payer: COMMERCIAL

## 2023-07-19 VITALS
HEIGHT: 71 IN | OXYGEN SATURATION: 98 % | BODY MASS INDEX: 28.42 KG/M2 | DIASTOLIC BLOOD PRESSURE: 72 MMHG | WEIGHT: 203 LBS | HEART RATE: 57 BPM | TEMPERATURE: 97.3 F | SYSTOLIC BLOOD PRESSURE: 111 MMHG

## 2023-07-19 PROCEDURE — 99214 OFFICE O/P EST MOD 30 MIN: CPT | Mod: 25

## 2023-07-19 PROCEDURE — 36415 COLL VENOUS BLD VENIPUNCTURE: CPT

## 2023-07-19 PROCEDURE — 93000 ELECTROCARDIOGRAM COMPLETE: CPT

## 2023-07-20 LAB
ALBUMIN SERPL ELPH-MCNC: 4.8 G/DL
ALP BLD-CCNC: 50 U/L
ALT SERPL-CCNC: 22 U/L
ANION GAP SERPL CALC-SCNC: 14 MMOL/L
AST SERPL-CCNC: 27 U/L
BILIRUB SERPL-MCNC: 1.3 MG/DL
BUN SERPL-MCNC: 16 MG/DL
CALCIUM SERPL-MCNC: 9.6 MG/DL
CHLORIDE SERPL-SCNC: 101 MMOL/L
CHOLEST SERPL-MCNC: 144 MG/DL
CK SERPL-CCNC: 151 U/L
CO2 SERPL-SCNC: 26 MMOL/L
CREAT SERPL-MCNC: 1.14 MG/DL
EGFR: 72 ML/MIN/1.73M2
ESTIMATED AVERAGE GLUCOSE: 111 MG/DL
FERRITIN SERPL-MCNC: 102 NG/ML
GLUCOSE SERPL-MCNC: 96 MG/DL
HBA1C MFR BLD HPLC: 5.5 %
HDLC SERPL-MCNC: 67 MG/DL
LDLC SERPL CALC-MCNC: 53 MG/DL
NONHDLC SERPL-MCNC: 77 MG/DL
POTASSIUM SERPL-SCNC: 4.8 MMOL/L
PROT SERPL-MCNC: 6.7 G/DL
PSA SERPL-MCNC: 1.94 NG/ML
SODIUM SERPL-SCNC: 141 MMOL/L
TRIGL SERPL-MCNC: 143 MG/DL
TSH SERPL-ACNC: 1.52 UIU/ML
VIT B12 SERPL-MCNC: 353 PG/ML

## 2023-07-24 NOTE — ASSESSMENT
[FreeTextEntry1] : hyperlipidemia continue atorvastatin ldl53\par hypertension continue losartan/hctz

## 2023-08-13 ENCOUNTER — RX RENEWAL (OUTPATIENT)
Age: 63
End: 2023-08-13

## 2023-11-14 ENCOUNTER — RX RENEWAL (OUTPATIENT)
Age: 63
End: 2023-11-14

## 2023-11-14 RX ORDER — ALFUZOSIN HYDROCHLORIDE 10 MG/1
10 TABLET, EXTENDED RELEASE ORAL DAILY
Qty: 90 | Refills: 1 | Status: ACTIVE | COMMUNITY
Start: 2022-10-28 | End: 1900-01-01

## 2024-01-16 NOTE — HISTORY OF PRESENT ILLNESS
[FreeTextEntry1] : Since his last visit,   Activity:   Pt. denies any chest pain, dyspnea, orthopnea, PND, palpitations, lightheadedness, syncope or lower extremity edema.   ===================== Labs/Diagnostics:  2023 TSH: 1.52 Lipid profile: T, TC: 144, HDL: 67, LDL: 53 K/Creat: 4.8/1.14 A1c: 5.5%  Echo (2022): normal LVSF w/ EF 56%, no significant valvular disease.   Stress EKG (2022): normal; pt. exercised for 11.36 minutes; no ischemic ST changes on EKG.

## 2024-01-16 NOTE — REASON FOR VISIT
[FreeTextEntry1] : Pt. is a 63-year-old M with PMHx of HTN and BPH who presents today for follow-up.

## 2024-01-17 ENCOUNTER — APPOINTMENT (OUTPATIENT)
Dept: HEART AND VASCULAR | Facility: CLINIC | Age: 64
End: 2024-01-17

## 2024-01-26 RX ORDER — LOSARTAN POTASSIUM AND HYDROCHLOROTHIAZIDE 12.5; 5 MG/1; MG/1
50-12.5 TABLET ORAL
Qty: 90 | Refills: 1 | Status: ACTIVE | COMMUNITY
Start: 2021-05-19 | End: 1900-01-01

## 2024-02-05 ENCOUNTER — RX RENEWAL (OUTPATIENT)
Age: 64
End: 2024-02-05

## 2024-02-05 RX ORDER — ATORVASTATIN CALCIUM 10 MG/1
10 TABLET, FILM COATED ORAL
Qty: 90 | Refills: 1 | Status: ACTIVE | COMMUNITY
Start: 2022-08-13 | End: 1900-01-01

## 2024-04-12 ENCOUNTER — TRANSCRIPTION ENCOUNTER (OUTPATIENT)
Age: 64
End: 2024-04-12

## 2024-05-21 RX ORDER — ZOLPIDEM TARTRATE 10 MG/1
10 TABLET ORAL
Qty: 30 | Refills: 2 | Status: ACTIVE | COMMUNITY
Start: 2021-04-09 | End: 1900-01-01

## 2024-06-05 ENCOUNTER — APPOINTMENT (OUTPATIENT)
Dept: UROLOGY | Facility: CLINIC | Age: 64
End: 2024-06-05
Payer: COMMERCIAL

## 2024-06-05 VITALS
OXYGEN SATURATION: 97 % | HEART RATE: 54 BPM | TEMPERATURE: 98.2 F | DIASTOLIC BLOOD PRESSURE: 66 MMHG | HEIGHT: 71 IN | SYSTOLIC BLOOD PRESSURE: 110 MMHG

## 2024-06-05 DIAGNOSIS — N40.1 BENIGN PROSTATIC HYPERPLASIA WITH LOWER URINARY TRACT SYMPMS: ICD-10-CM

## 2024-06-05 DIAGNOSIS — R35.1 BENIGN PROSTATIC HYPERPLASIA WITH LOWER URINARY TRACT SYMPMS: ICD-10-CM

## 2024-06-05 PROCEDURE — 99214 OFFICE O/P EST MOD 30 MIN: CPT

## 2024-06-05 RX ORDER — VALACYCLOVIR 500 MG/1
500 TABLET, FILM COATED ORAL DAILY
Qty: 30 | Refills: 0 | Status: ACTIVE | COMMUNITY
Start: 2024-05-09 | End: 1900-01-01

## 2024-06-05 NOTE — HISTORY OF PRESENT ILLNESS
[FreeTextEntry1] : Dear Dr. Drew,  Thank you so much for the referral to help care for your patient.  Chief Complaint: BPH  Date of first visit: 08/11/2021  FRANCESCA BURROWS is a 63 year old  man with PMHx HTN, gout, HLD,  BPH. His urinary symptoms have been occurring for uroxatral [id number]/[id number]. he is still bothered and feels tired due to nocturia.  He has noticed a curvature with erections over the past year which is starting to bother him. Reports curve is 20 degrees getting worse Dr HERNANDEZ following.  HSV 2+ - given Valtrex x3 days at last office visit and "rash" has resolved. STI panel negative. Patient started noticing "lesions" two years ago, was tested for HSV2 and was negative. Had another lesion 6 months ago. He is not monogamous but uses protection. The lesions are not painful and are slightly pruritic at times. He is currently going through a lot of stress. His wife lost her job and they are in the process of selling multiple houses. Since this new stress, the lesions have been occurring more frequently.  He has never had a prostate MRI or biopsy. He does have a family hx of CaP in his father. His father was diagnosed in his 60s, had a prostatectomy, and he is still alive and well.  He is currently on naltrexone to brandie his possible alcoholism. Seeing a psychiatrist who has prescribed this.  6/5/24 - 59cc 9/15/21 Flow/PVR: Peak 22.5 ml/s, avg 11.7 ml/s, vv 317.7, 15cc 8/11/21 Flow/PVR: Peak 10.2 ml/s, vv 194ml, PVR 42ml  PSA HX 7/20/23 1.94  5/19/2021- 1.82 4/18/2019- 1.12  06/05/2024 IPSS   23  QOL 3 nocturia x 3 (bothersome)  LISA 17 PVR: 59 cc  9/15/2021 IPSS 18 QOL 3 LISA 19  08/11/2021 IPSS 22 QOL 4 LISA 18  The patient denies dysuria, hematuria, fevers, chills, nausea and or vomiting and no unexplained weight loss.  All pertinent laboratory, films and physician notes were reviewed. Questionnaire results were discussed with patient.

## 2024-06-05 NOTE — ASSESSMENT
[FreeTextEntry1] : 64 yo male with BPH, HSV2, Peyronie's. Normal PSA 5/2021, +FH, neg LIZETTE. He reports a scrotal mass post vasectomy which is not appreciated on exam. He states it has not changed in the past 15 years.  BPH symptoms are worse stopped drinking before bed.  Cystoscopy / Protastate/Pelvic US   1. Discussed importance of suppressive therapy and condom use at all times. We will prescribe 500mg valtrex daily however he would like to think about it. He understands the risk of not taking medication and the importance of protection - this has done well  2. Stopped Flomax switched to uroxatral due to vertigo  3. ?Scrotal mass-unchanged post vasectomy-offered scrotal US. He would like to continue to monitor for changes and US if changes occur  4. Nocturia--he has been tested for JERMAINE (negative work up) - fluid restrict 4 hrs before bed.  Follow up with Dr Harper for Barbie's  Follow up with us in 6 months with Flow/PVR  Thank you very much for allowing me to assist in the care of this patient. Please do not hesitate to contact me with any additional questions or concerns.      Sincerely,     Jules Ray D.O. Professor of Urology and Radiology  of Urology at [facility] [place] [facility] Director for Prostate Cancer [address] [facility], [facility] Phone: [phone]

## 2024-06-06 LAB
APPEARANCE: CLEAR
BACTERIA: NEGATIVE /HPF
BILIRUBIN URINE: NEGATIVE
BLOOD URINE: NEGATIVE
CAST: 0 /LPF
COLOR: NORMAL
EPITHELIAL CELLS: 0 /HPF
GLUCOSE QUALITATIVE U: NEGATIVE MG/DL
KETONES URINE: NEGATIVE MG/DL
LEUKOCYTE ESTERASE URINE: NEGATIVE
MICROSCOPIC-UA: NORMAL
NITRITE URINE: NEGATIVE
PH URINE: 7.5
PROTEIN URINE: NORMAL MG/DL
RED BLOOD CELLS URINE: 1 /HPF
SPECIFIC GRAVITY URINE: 1.02
UROBILINOGEN URINE: 1 MG/DL
WHITE BLOOD CELLS URINE: 0 /HPF

## 2024-06-07 ENCOUNTER — TRANSCRIPTION ENCOUNTER (OUTPATIENT)
Age: 64
End: 2024-06-07

## 2024-06-07 LAB — BACTERIA UR CULT: NORMAL

## 2024-08-01 ENCOUNTER — APPOINTMENT (OUTPATIENT)
Dept: UROLOGY | Facility: CLINIC | Age: 64
End: 2024-08-01
Payer: COMMERCIAL

## 2024-08-01 VITALS
SYSTOLIC BLOOD PRESSURE: 111 MMHG | WEIGHT: 203 LBS | HEIGHT: 71 IN | DIASTOLIC BLOOD PRESSURE: 62 MMHG | TEMPERATURE: 97.2 F | BODY MASS INDEX: 28.42 KG/M2 | HEART RATE: 49 BPM

## 2024-08-01 DIAGNOSIS — E29.1 TESTICULAR HYPOFUNCTION: ICD-10-CM

## 2024-08-01 PROCEDURE — 99213 OFFICE O/P EST LOW 20 MIN: CPT

## 2024-08-01 PROCEDURE — G2211 COMPLEX E/M VISIT ADD ON: CPT | Mod: NC

## 2024-08-01 NOTE — ASSESSMENT
[FreeTextEntry1] : 64M here for PD and hypogonadism   PD - not interested in procedures  - not interested in intralesional injections  - some ED, better with sildenafil 7/10 minial bother reassured  Hypogonadism - TT LH FSH E2 - PSA today  results by phone isai HERNÁNDEZ

## 2024-08-01 NOTE — HISTORY OF PRESENT ILLNESS
[FreeTextEntry1] : 64M here for follow up for PD  - reports no advancement of PD  - occasionally discomfort but classified as 2/10  - patient also reports decreased libido, poor energy  - interested in TRT - wishes to review treatment options for PD  - urine testing negative with Rastinehad  Denies: fever chills n/v/d urinary complaints flank pain   PSA: 7/2023 1.94

## 2024-08-01 NOTE — END OF VISIT
[FreeTextEntry3] : I, Dr. Harper, personally performed the evaluation and management (E/M) services for this established patient who presents today with (a) new problem(s)/exacerbation of (an) existing condition(s). That E/M includes conducting the clinically appropriate interval history &/or exam, assessing all new/exacerbated conditions, and establishing a new plan of care. Today, my YRN, Preston Montoya, was here to observe my evaluation and management service for this new problem/exacerbated condition and follow the plan of care established by me going forward

## 2024-08-05 ENCOUNTER — RX RENEWAL (OUTPATIENT)
Age: 64
End: 2024-08-05

## 2024-08-15 DIAGNOSIS — E29.1 TESTICULAR HYPOFUNCTION: ICD-10-CM

## 2024-08-20 ENCOUNTER — NON-APPOINTMENT (OUTPATIENT)
Age: 64
End: 2024-08-20

## 2024-09-10 DIAGNOSIS — E29.1 TESTICULAR HYPOFUNCTION: ICD-10-CM

## 2024-09-10 RX ORDER — SYRINGE W-NEEDLE,DISPOSAB,3 ML 21 G X 1"
21G X 1" SYRINGE, EMPTY DISPOSABLE MISCELLANEOUS
Qty: 30 | Refills: 3 | Status: ACTIVE | COMMUNITY
Start: 2024-09-10 | End: 1900-01-01

## 2024-09-10 RX ORDER — 70%ISOPROPYL ALCOHOL 0.7 ML/ML
70 SWAB TOPICAL
Qty: 1 | Refills: 3 | Status: ACTIVE | COMMUNITY
Start: 2024-09-10 | End: 1900-01-01

## 2024-09-10 RX ORDER — TESTOSTERONE CYPIONATE 200 MG/ML
200 VIAL (ML) INTRAMUSCULAR
Qty: 4 | Refills: 0 | Status: ACTIVE | COMMUNITY
Start: 2024-09-10 | End: 1900-01-01

## 2024-09-10 RX ORDER — SYRINGE WITH NEEDLE, 1 ML 25GX5/8"
18G X 1-1/2" SYRINGE, EMPTY DISPOSABLE MISCELLANEOUS
Qty: 15 | Refills: 11 | Status: ACTIVE | COMMUNITY
Start: 2024-09-10 | End: 1900-01-01

## 2024-09-23 ENCOUNTER — RX RENEWAL (OUTPATIENT)
Age: 64
End: 2024-09-23

## 2024-09-24 ENCOUNTER — APPOINTMENT (OUTPATIENT)
Dept: UROLOGY | Facility: CLINIC | Age: 64
End: 2024-09-24
Payer: COMMERCIAL

## 2024-09-24 VITALS
SYSTOLIC BLOOD PRESSURE: 126 MMHG | TEMPERATURE: 97.2 F | OXYGEN SATURATION: 98 % | DIASTOLIC BLOOD PRESSURE: 71 MMHG | HEART RATE: 54 BPM

## 2024-09-24 PROCEDURE — G2211 COMPLEX E/M VISIT ADD ON: CPT | Mod: NC

## 2024-09-24 PROCEDURE — 99214 OFFICE O/P EST MOD 30 MIN: CPT

## 2024-09-24 RX ORDER — TADALAFIL 5 MG/1
5 TABLET ORAL
Qty: 90 | Refills: 3 | Status: ACTIVE | COMMUNITY
Start: 2024-09-24 | End: 1900-01-01

## 2024-09-24 NOTE — ASSESSMENT
[FreeTextEntry1] : PD, hypogonadism on TRT, ED  The role of testosterone replacement therapy was discussed as well. The controversy around TRT was discussed at length. Studies describing improvement in cardiovascular outcomes, improvement in diabetes outcomes, and improvement in bone density were discussed at length. In addition more recent studies demonstrating an increasing cardiovascular mortality with TRT were discussed. We spoke about the pitfalls of each of these studies. He understands that it is likely that he will have more of an improvement with the testosterone replacement and that the benefits outweigh the risks in his situation.  He understands that TRT is contraindicated in anyone attempting pregnancy and will lead to azoospermia unless treated with medications such as Clomid or Arimidex. The unclear return of spermatogenic function after discontinuation of traditional TRT was also discussed.   The various treatment options for testosterone replacement were discussed at length as well. They include intramuscular testosterone, transdermal testosterone gels, subcutaneous testosterone pellets, and certain oral medications. Risks of each treatment, including risk of transference to other family members, unproven - and highly unlikely -  risk of induction of prostate cancer, polycythemia, elevations in liver function tests among other risks were discussed at length. start aveed if not covered will start T cyp 100 q 7 days Cialis 5 mg PRN  Wants to hold off on intralesional injections for PD  labs to follow aveed

## 2024-09-24 NOTE — HISTORY OF PRESENT ILLNESS
[FreeTextEntry1] : 64M being followed for PD, hypogonadism on TRT, ED  last visit 8/1/24: was not interested in intralesional injections or procedures for PD; recommended hormonal testing and PSA  Hypogonadism: Reports having low libido, good energy level (states he in general needs lots of sleep). 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  Not interested in future fertility. Had vasectomy.  BPH: Pt planning to see Dr. Dunn for cystoscopy for worsening BPH/LUTS, on alfuzosin 10 mg daily. No new dysuria or hematuria. Drinks ~14 alcohol drinks/week.  ED: Tried half a tablet of Sildenafil 20 mg in the past with good effect (sufficient to penetrate), but reports he would get very flushed ; interested in trying alternative form. Prefers episodic versus daily dose Cialis trial.   PD: Wants to hold off on intralesional injections. No pain with plaque. Does not interfere with sexual activity. Per pt, PD is not worsening. used Restorex, thinks it improved length but no effect on PD. Did not use it regularly.  Last Doppler US 11/2021: 2.3 mm plaque, non-calcified, distal shaft 38 degree dorsal curvature   Has been exercising more regularly, has a , difficult to  keep up as he travels once a week between [place] and [place] (lives between [place] and [place]).  Recently dog (Moriah) passed away from lymphoma so he is grieving.   8/19/24:  Total T 307  8/5/24: Total T 251 Free T 46.2 FSH 7.4 LH 4.1  estradiol 22

## 2024-09-24 NOTE — HISTORY OF PRESENT ILLNESS
[FreeTextEntry1] : 64M being followed for PD, hypogonadism on TRT, ED  last visit 8/1/24: was not interested in intralesional injections or procedures for PD; recommended hormonal testing and PSA  Hypogonadism: Reports having low libido, good energy level (states he in general needs lots of sleep). 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  Not interested in future fertility. Had vasectomy.  BPH: Pt planning to see Dr. Dunn for cystoscopy for worsening BPH/LUTS, on alfuzosin 10 mg daily. No new dysuria or hematuria. Drinks ~14 alcohol drinks/week.  ED: Tried half a tablet of Sildenafil 20 mg in the past with good effect (sufficient to penetrate), but reports he would get very flushed ; interested in trying alternative form. Prefers episodic versus daily dose Cialis trial.   PD: Wants to hold off on intralesional injections. No pain with plaque. Does not interfere with sexual activity. Per pt, PD is not worsening. used Restorex, thinks it improved length but no effect on PD. Did not use it regularly.  Last Doppler US 11/2021: 2.3 mm plaque, non-calcified, distal shaft 38 degree dorsal curvature   Has been exercising more regularly, has a , difficult to  keep up as he travels [place] between [place] and [place] ([place]ives between [place] and [place]  Recently dog (Moriah) passed away from lymphoma so he is grieving.   8/19/24:  Total T 307  8/5/24: Total T 251 Free T 46.2 FSH 7.4 LH 4.1  estradiol 22

## 2024-09-26 RX ORDER — TESTOSTERONE UNDECANOATE 250 MG/ML
750 INJECTION INTRAMUSCULAR
Qty: 1 | Refills: 0 | Status: ACTIVE | COMMUNITY
Start: 2024-09-26 | End: 1900-01-01

## 2024-09-26 RX ORDER — VALACYCLOVIR 500 MG/1
500 TABLET, FILM COATED ORAL DAILY
Qty: 30 | Refills: 0 | Status: ACTIVE | COMMUNITY
Start: 2024-09-26 | End: 1900-01-01

## 2024-10-01 ENCOUNTER — LABORATORY RESULT (OUTPATIENT)
Age: 64
End: 2024-10-01

## 2024-10-01 ENCOUNTER — APPOINTMENT (OUTPATIENT)
Dept: UROLOGY | Facility: CLINIC | Age: 64
End: 2024-10-01

## 2024-10-01 ENCOUNTER — APPOINTMENT (OUTPATIENT)
Dept: HEART AND VASCULAR | Facility: CLINIC | Age: 64
End: 2024-10-01
Payer: COMMERCIAL

## 2024-10-01 VITALS
WEIGHT: 203 LBS | HEART RATE: 55 BPM | TEMPERATURE: 98 F | HEIGHT: 71 IN | SYSTOLIC BLOOD PRESSURE: 104 MMHG | DIASTOLIC BLOOD PRESSURE: 62 MMHG | BODY MASS INDEX: 28.42 KG/M2

## 2024-10-01 VITALS — DIASTOLIC BLOOD PRESSURE: 64 MMHG | SYSTOLIC BLOOD PRESSURE: 108 MMHG

## 2024-10-01 DIAGNOSIS — N40.1 BENIGN PROSTATIC HYPERPLASIA WITH LOWER URINARY TRACT SYMPMS: ICD-10-CM

## 2024-10-01 DIAGNOSIS — R07.9 CHEST PAIN, UNSPECIFIED: ICD-10-CM

## 2024-10-01 DIAGNOSIS — R35.1 BENIGN PROSTATIC HYPERPLASIA WITH LOWER URINARY TRACT SYMPMS: ICD-10-CM

## 2024-10-01 DIAGNOSIS — B00.9 HERPESVIRAL INFECTION, UNSPECIFIED: ICD-10-CM

## 2024-10-01 DIAGNOSIS — E29.1 TESTICULAR HYPOFUNCTION: ICD-10-CM

## 2024-10-01 DIAGNOSIS — I10 ESSENTIAL (PRIMARY) HYPERTENSION: ICD-10-CM

## 2024-10-01 DIAGNOSIS — N48.9 DISORDER OF PENIS, UNSPECIFIED: ICD-10-CM

## 2024-10-01 PROCEDURE — 93015 CV STRESS TEST SUPVJ I&R: CPT

## 2024-10-01 PROCEDURE — 93306 TTE W/DOPPLER COMPLETE: CPT

## 2024-10-01 PROCEDURE — ZZZZZ: CPT | Mod: NC

## 2024-10-01 PROCEDURE — 36415 COLL VENOUS BLD VENIPUNCTURE: CPT

## 2024-10-01 PROCEDURE — 99214 OFFICE O/P EST MOD 30 MIN: CPT | Mod: 25

## 2024-10-01 RX ORDER — LOSARTAN POTASSIUM 50 MG/1
50 TABLET, FILM COATED ORAL
Qty: 90 | Refills: 1 | Status: ACTIVE | COMMUNITY
Start: 2024-10-01 | End: 1900-01-01

## 2024-10-02 LAB
ALBUMIN SERPL ELPH-MCNC: 4.5 G/DL
ALP BLD-CCNC: 53 U/L
ALT SERPL-CCNC: 32 U/L
ANION GAP SERPL CALC-SCNC: 19 MMOL/L
AST SERPL-CCNC: 78 U/L
BILIRUB SERPL-MCNC: 1 MG/DL
BUN SERPL-MCNC: 12 MG/DL
CALCIUM SERPL-MCNC: 9.4 MG/DL
CHLORIDE SERPL-SCNC: 103 MMOL/L
CHOLEST SERPL-MCNC: 156 MG/DL
CO2 SERPL-SCNC: 22 MMOL/L
CREAT SERPL-MCNC: 1.09 MG/DL
EGFR: 75 ML/MIN/1.73M2
ESTIMATED AVERAGE GLUCOSE: 111 MG/DL
ESTRADIOL SERPL-MCNC: 27 PG/ML
FERRITIN SERPL-MCNC: 131 NG/ML
GLUCOSE SERPL-MCNC: 97 MG/DL
HBA1C MFR BLD HPLC: 5.5 %
HCT VFR BLD CALC: 43.8 %
HDLC SERPL-MCNC: 75 MG/DL
HGB BLD-MCNC: 14.2 G/DL
LDLC SERPL CALC-MCNC: 65 MG/DL
LH SERPL-ACNC: 8.2 IU/L
MCHC RBC-ENTMCNC: 31.3 PG
MCHC RBC-ENTMCNC: 32.4 GM/DL
MCV RBC AUTO: 96.5 FL
NONHDLC SERPL-MCNC: 81 MG/DL
PLATELET # BLD AUTO: 199 K/UL
POTASSIUM SERPL-SCNC: 4.3 MMOL/L
PROT SERPL-MCNC: 6.8 G/DL
RBC # BLD: 4.54 M/UL
RBC # FLD: 13 %
SODIUM SERPL-SCNC: 144 MMOL/L
TRIGL SERPL-MCNC: 86 MG/DL
TSH SERPL-ACNC: 2.1 UIU/ML
VIT B12 SERPL-MCNC: 532 PG/ML
WBC # FLD AUTO: 5.14 K/UL

## 2024-10-07 LAB
PSA FREE FLD-MCNC: 14 %
PSA FREE SERPL-MCNC: 0.35 NG/ML
PSA SERPL-MCNC: 2.46 NG/ML
TESTOST FREE SERPL-MCNC: 7.3 PG/ML
TESTOST FREE SERPL-MCNC: 7.5 PG/ML
TESTOST SERPL-MCNC: 316 NG/DL
TESTOST SERPL-MCNC: 316 NG/DL

## 2024-10-07 NOTE — HISTORY OF PRESENT ILLNESS
[FreeTextEntry1] : 64-year-old M with PMHx of HTN, hypogonadism, and BPH who presents today for follow-up, echocardiogram and stress test.  Since his last visit on 2024, he remains well and has no acute concerns. He will be starting testosterone injection therapy for hypogonadism.   He continues to be active without difficulty. Denies exertional chest pain or dyspnea.  Pt. denies any chest pain, dyspnea, orthopnea, PND, palpitations, lightheadedness, syncope or lower extremity edema.  ===================== Previous workup: Labs (2023): TSH: 1.52 Lipid profile: T, TC: 144, HDL: 67, LDL: 53 K/Creat: 4.8/1.14 A1c: 5.5%  Echo (2022): 1. Normal left ventricular size, wall thickness, wall motion, and systolic function. 2. Normal right ventricular size and systolic function. 3. The right atrium is normal in size. 4. No significant valvular disease. 5. No echocardiographic evidence of pulmonary hypertension. 6. No pericardial effusion seen.  Stress EKG (2022): normal; pt. exercised for 11.36 minutes; no ischemic ST changes on EKG.

## 2024-10-07 NOTE — DISCUSSION/SUMMARY
[FreeTextEntry1] : 64-year-old M with PMHx of HTN, hypogonadism, and BPH who presents today for follow-up, echocardiogram and stress test.  HTN: BP goal < 130/80. BP on the lower end of normal. Transition from Losartan-HCTZ 50-12.5 to Losartan 50 mg QD only. Encouraged measuring and recording blood pressures at home. HLD / CAD risk: No chest pain, very active without difficulty. Echocardiogram performed in office today. Stress EKG today showed no ischemic changes. Significant coronary disease is unlikely. LDL goal < 100, most recent LDL 53. Labs drawn today. Continue Atorvastatin 10, healthy diet and aerobic exercise.  Hypogonadism: Patient to start testosterone injection.  I, Dr. Drew, personally performed the evaluation and management (E/M) services for this established patient who presents today with (a) new problem(s)/exacerbation of (an) existing condition(s). That E/M includes conducting the clinically appropriate interval history &/or exam, assessing all new/exacerbated conditions, and establishing a new plan of care. Today, my ACP, Rossi De Souza was here to observe &/or participate in the visit & follow plan of care established by me.

## 2024-10-07 NOTE — ASSESSMENT
Detail Level: Simple [FreeTextEntry1] : Pt. is a 63-year-old M with PMHx of HTN and BPH who presents today for follow-up.

## 2024-10-17 ENCOUNTER — RX RENEWAL (OUTPATIENT)
Age: 64
End: 2024-10-17

## 2024-10-23 ENCOUNTER — APPOINTMENT (OUTPATIENT)
Dept: UROLOGY | Facility: CLINIC | Age: 64
End: 2024-10-23

## 2024-10-23 VITALS
WEIGHT: 203 LBS | BODY MASS INDEX: 28.42 KG/M2 | DIASTOLIC BLOOD PRESSURE: 70 MMHG | TEMPERATURE: 97.8 F | SYSTOLIC BLOOD PRESSURE: 130 MMHG | HEIGHT: 71 IN | HEART RATE: 49 BPM

## 2024-10-23 DIAGNOSIS — E29.1 TESTICULAR HYPOFUNCTION: ICD-10-CM

## 2024-10-23 PROCEDURE — 99214 OFFICE O/P EST MOD 30 MIN: CPT | Mod: 25

## 2024-10-24 ENCOUNTER — RX RENEWAL (OUTPATIENT)
Age: 64
End: 2024-10-24

## 2024-10-24 RX ORDER — VALACYCLOVIR 500 MG/1
500 TABLET, FILM COATED ORAL DAILY
Qty: 30 | Refills: 0 | Status: ACTIVE | COMMUNITY
Start: 2024-10-24 | End: 1900-01-01

## 2024-10-25 LAB
APPEARANCE: CLEAR
BACTERIA UR CULT: NORMAL
BACTERIA: NEGATIVE /HPF
BILIRUBIN URINE: NEGATIVE
BLOOD URINE: NEGATIVE
CAST: 0 /LPF
COLOR: YELLOW
EPITHELIAL CELLS: 0 /HPF
GLUCOSE QUALITATIVE U: NEGATIVE MG/DL
KETONES URINE: NEGATIVE MG/DL
LEUKOCYTE ESTERASE URINE: NEGATIVE
MICROSCOPIC-UA: NORMAL
NITRITE URINE: NEGATIVE
PH URINE: 6.5
PROTEIN URINE: NEGATIVE MG/DL
RED BLOOD CELLS URINE: 0 /HPF
SPECIFIC GRAVITY URINE: 1.01
UROBILINOGEN URINE: 0.2 MG/DL
WHITE BLOOD CELLS URINE: 0 /HPF